# Patient Record
Sex: MALE | NOT HISPANIC OR LATINO | URBAN - METROPOLITAN AREA
[De-identification: names, ages, dates, MRNs, and addresses within clinical notes are randomized per-mention and may not be internally consistent; named-entity substitution may affect disease eponyms.]

---

## 2017-01-01 ENCOUNTER — OFFICE VISIT - HEALTHEAST (OUTPATIENT)
Dept: FAMILY MEDICINE | Facility: CLINIC | Age: 82
End: 2017-01-01

## 2017-01-01 ENCOUNTER — COMMUNICATION - HEALTHEAST (OUTPATIENT)
Dept: FAMILY MEDICINE | Facility: CLINIC | Age: 82
End: 2017-01-01

## 2017-01-01 ENCOUNTER — COMMUNICATION - HEALTHEAST (OUTPATIENT)
Dept: SCHEDULING | Facility: CLINIC | Age: 82
End: 2017-01-01

## 2017-01-01 ENCOUNTER — AMBULATORY - HEALTHEAST (OUTPATIENT)
Dept: PULMONOLOGY | Facility: OTHER | Age: 82
End: 2017-01-01

## 2017-01-01 ENCOUNTER — AMBULATORY - HEALTHEAST (OUTPATIENT)
Dept: FAMILY MEDICINE | Facility: CLINIC | Age: 82
End: 2017-01-01

## 2017-01-01 ENCOUNTER — COMMUNICATION - HEALTHEAST (OUTPATIENT)
Dept: PULMONOLOGY | Facility: OTHER | Age: 82
End: 2017-01-01

## 2017-01-01 ENCOUNTER — RECORDS - HEALTHEAST (OUTPATIENT)
Dept: ADMINISTRATIVE | Facility: OTHER | Age: 82
End: 2017-01-01

## 2017-01-01 ENCOUNTER — RECORDS - HEALTHEAST (OUTPATIENT)
Dept: LAB | Facility: CLINIC | Age: 82
End: 2017-01-01

## 2017-01-01 DIAGNOSIS — G89.29 CHRONIC PAIN: ICD-10-CM

## 2017-01-01 DIAGNOSIS — I48.91 ATRIAL FIBRILLATION WITH RVR (H): ICD-10-CM

## 2017-01-01 DIAGNOSIS — I63.9 CVA (CEREBRAL VASCULAR ACCIDENT) (H): ICD-10-CM

## 2017-01-01 DIAGNOSIS — I10 ESSENTIAL HYPERTENSION WITH GOAL BLOOD PRESSURE LESS THAN 140/90: ICD-10-CM

## 2017-01-01 DIAGNOSIS — J90 PLEURAL EFFUSION: ICD-10-CM

## 2017-01-01 DIAGNOSIS — J18.9 PNEUMONIA: ICD-10-CM

## 2017-01-01 DIAGNOSIS — I10 HYPERTENSION: ICD-10-CM

## 2017-01-01 DIAGNOSIS — R06.00 DYSPNEA: ICD-10-CM

## 2017-01-01 DIAGNOSIS — K59.00 CONSTIPATION: ICD-10-CM

## 2017-01-01 DIAGNOSIS — J44.9 COPD (CHRONIC OBSTRUCTIVE PULMONARY DISEASE) (H): ICD-10-CM

## 2017-01-01 DIAGNOSIS — J90 RECURRENT PLEURAL EFFUSION ON LEFT: ICD-10-CM

## 2017-01-01 DIAGNOSIS — R00.0 SINUS TACHYCARDIA: ICD-10-CM

## 2017-01-01 DIAGNOSIS — J90 LOCULATED PLEURAL EFFUSION: ICD-10-CM

## 2017-01-01 DIAGNOSIS — J20.9 ACUTE BRONCHITIS, UNSPECIFIED ORGANISM: ICD-10-CM

## 2017-01-01 DIAGNOSIS — K31.89 GASTRIC PERFORATION, ACUTE: ICD-10-CM

## 2017-01-01 DIAGNOSIS — I48.0 PAROXYSMAL ATRIAL FIBRILLATION (H): ICD-10-CM

## 2017-01-01 LAB
BASOPHILS # BLD AUTO: 0 THOU/UL (ref 0–0.2)
BASOPHILS NFR BLD AUTO: 0 % (ref 0–2)
EOSINOPHIL # BLD AUTO: 0.2 THOU/UL (ref 0–0.4)
EOSINOPHIL NFR BLD AUTO: 2 % (ref 0–6)
ERYTHROCYTE [DISTWIDTH] IN BLOOD BY AUTOMATED COUNT: 14.4 % (ref 11–14.5)
HCT VFR BLD AUTO: 36.6 % (ref 40–54)
HGB BLD-MCNC: 11 G/DL (ref 14–18)
LAB AP CHARGES (HE HISTORICAL CONVERSION): NORMAL
LYMPHOCYTES # BLD AUTO: 2.4 THOU/UL (ref 0.8–4.4)
LYMPHOCYTES NFR BLD AUTO: 24 % (ref 20–40)
MCH RBC QN AUTO: 28.1 PG (ref 27–34)
MCHC RBC AUTO-ENTMCNC: 30.1 G/DL (ref 32–36)
MCV RBC AUTO: 94 FL (ref 80–100)
MONOCYTES # BLD AUTO: 0.9 THOU/UL (ref 0–0.9)
MONOCYTES NFR BLD AUTO: 10 % (ref 2–10)
NEUTROPHILS # BLD AUTO: 6.1 THOU/UL (ref 2–7.7)
NEUTROPHILS NFR BLD AUTO: 64 % (ref 50–70)
PATH REPORT.COMMENTS IMP SPEC: NORMAL
PATH REPORT.COMMENTS IMP SPEC: NORMAL
PATH REPORT.FINAL DX SPEC: NORMAL
PATH REPORT.MICROSCOPIC SPEC OTHER STN: ABNORMAL
PATH REPORT.MICROSCOPIC SPEC OTHER STN: NORMAL
PATH REPORT.RELEVANT HX SPEC: NORMAL
PLATELET # BLD AUTO: 394 THOU/UL (ref 140–440)
PMV BLD AUTO: 10.4 FL (ref 8.5–12.5)
RBC # BLD AUTO: 3.91 MILL/UL (ref 4.4–6.2)
WBC: 9.7 THOU/UL (ref 4–11)

## 2017-01-01 RX ORDER — AMOXICILLIN 250 MG
2 CAPSULE ORAL DAILY PRN
Qty: 30 TABLET | Refills: 1 | Status: SHIPPED | OUTPATIENT
Start: 2017-01-01

## 2017-01-01 RX ORDER — ALBUTEROL SULFATE 90 UG/1
1-2 AEROSOL, METERED RESPIRATORY (INHALATION) EVERY 4 HOURS PRN
Qty: 8.5 G | Refills: 0 | Status: SHIPPED | OUTPATIENT
Start: 2017-01-01

## 2017-01-01 ASSESSMENT — MIFFLIN-ST. JEOR
SCORE: 1076.81
SCORE: 1092.7

## 2017-01-09 ENCOUNTER — COMMUNICATION - HEALTHEAST (OUTPATIENT)
Dept: FAMILY MEDICINE | Facility: CLINIC | Age: 82
End: 2017-01-09

## 2017-01-29 ENCOUNTER — COMMUNICATION - HEALTHEAST (OUTPATIENT)
Dept: FAMILY MEDICINE | Facility: CLINIC | Age: 82
End: 2017-01-29

## 2018-01-01 ENCOUNTER — COMMUNICATION - HEALTHEAST (OUTPATIENT)
Dept: FAMILY MEDICINE | Facility: CLINIC | Age: 83
End: 2018-01-01

## 2018-01-01 ENCOUNTER — HOME CARE/HOSPICE - HEALTHEAST (OUTPATIENT)
Dept: HOSPICE | Facility: HOSPICE | Age: 83
End: 2018-01-01

## 2018-01-01 DIAGNOSIS — I63.9 CVA (CEREBRAL VASCULAR ACCIDENT) (H): ICD-10-CM

## 2018-01-01 DIAGNOSIS — J44.1 COPD EXACERBATION (H): ICD-10-CM

## 2018-01-01 RX ORDER — LISINOPRIL 20 MG/1
10 TABLET ORAL DAILY
Qty: 90 TABLET | Refills: 1 | Status: SHIPPED | OUTPATIENT
Start: 2018-01-01

## 2021-05-30 VITALS — WEIGHT: 113 LBS | HEIGHT: 62 IN | BODY MASS INDEX: 20.8 KG/M2

## 2021-05-31 VITALS — HEIGHT: 61 IN | WEIGHT: 120 LBS | BODY MASS INDEX: 22.66 KG/M2

## 2021-05-31 VITALS — BODY MASS INDEX: 21.97 KG/M2 | WEIGHT: 120.1 LBS

## 2021-05-31 VITALS — WEIGHT: 123 LBS | BODY MASS INDEX: 22.5 KG/M2

## 2021-06-09 NOTE — PROGRESS NOTES
Assessment:     1. Pneumonia  traMADol (ULTRAM) 50 mg tablet    Comprehensive Metabolic Panel    HM1(CBC and Differential)    HM1 (CBC with Diff)   2. Hypertension     3. Atrial fibrillation with RVR         Plan:     1. Pneumonia  Patient is being seen after a four-day hospitalization for pneumonia.  Patient does have chronic lung disease with chronic recurrent left pleural effusion.  Patient was hospitalized with pneumonia and lactic acidosis was treated with intravenous antibiotics successfully treated rehydrated.  Patient presents today for a posthospital check is accompanied by his son.  The  failed to show up.  We communicated well  - traMADol (ULTRAM) 50 mg tablet; Take 1 tablet by mouth every 6 hours as needed for pain  Dispense: 30 tablet; Refill: 2  - Comprehensive Metabolic Panel  - HM1(CBC and Differential)  - HM1 (CBC with Diff)    2. Hypertension  Excellent numbers.  Continue lisinopril    3. Atrial fibrillation with RVR  Atrial fib at present.  Pros and cons of warfarin therapy.  Discussed we have decided on a baby aspirin with his past medical history of a GI perforation and GI bleed      Subjective:   Patient is being seen for follow-up after a four-day hospitalization for pneumonia.  Patient has chronic left sided pleural effusion complex past medical history recent history.  Gastrointestinal bleed secondary to gastrointestinal perforation.  Hypertension.  During hospitalization, well controlled with lisinopril.  Patient was in lactic acidosis due to pneumonia.  IV hydration helped the patient's recovery.  He was discharged and told to follow-up here in the clinic.  I am doing a medical reconciliation today.  We have reviewed all his medications.  We have decided to stay the course with his lisinopril.  His hypertensive control preventative a basal metabolic profile today.  He is to continue with his hydrochlorothiazide.  The question came up during his hospitalization whether patient  "needed warfarin with his history of GI bleed.  I think this is not the way to goal.  We should keep him on a baby aspirin.  He was not in atrial fibrillation today.  He feels well, eating well, accompanied by his son  failed to show up.  We are communicating.  He did quite well.  QUESTIONS THAT WERE ASKED WERE ANSWERED I PERSONALLY REVIEWED, FAMILY, SOCIAL HISTORY, SURGERIES, ALLERGIES, PROBLEM LIST AND SPENT time with coordination of care and discussion with the son.  We have asked him to return in 1 month's time.  I'll repeat his chest x-ray and go over his clinical findings.  Again, in addition to reviewing his medicines that he takes at home.  He's been getting excellent home care;30 minutes , 50% of the time was spent coordinating care and counseling the son    Review of Systems: A complete 14 point review of systems was obtained and is negative or as stated in the history of present illness.    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      Severe malnutrition      Stroke      No family history on file.  Past Surgical History:   Procedure Laterality Date     EXPLORATORY LAPAROTOMY N/A 9/27/2016    Procedure: LAPAROTOMY, WITH KEYA PATCH FOR PERFORATED ULCER;  Surgeon: Demian Lopez MD;  Location: [facility];  Service:      Social History   Substance Use Topics     Smoking status: Former Smoker     Quit date: 7/1/2012     Smokeless tobacco: Never Used     Alcohol use No         Objective:     Visit Vitals     BP 98/58     Pulse 80     Temp 97  F (36.1  C)     Ht 5' 2\" (1.575 m)     Wt 113 lb (51.3 kg)     BMI 20.67 kg/m2       General Appearance:  Alert, cooperative, no distress  Head:  " Normocephalic, no obvious abnormality  Ears: TM anatomy normal  Eyes:  PERRL, EOM's intact, conjunctiva and corneas clear  Nose:  Nares symmetrical, septum midline, mucosa pink, no sinus tenderness  Throat:  Lips, tongue, and mucosa are moist, pink, and intact  Neck:  Supple, symmetrical, trachea midline, no adenopathy; thyroid: no enlargement, symmetric,no tenderness/mass/nodules; no carotid bruit, no JVD  Back:  Symmetrical, no curvature, ROM normal, no CVA tenderness  Chest/Breast:  No mass or tenderness  Lungs:  Chest sounds are generally absent on the left side.  The right sided nodularity, rales or rhonchi are absent  Heart:  Normal PMI, regular rate & rhythm, S1 and S2 normal, no murmurs, rubs, or gallops  Abdomen:  Soft, non-tender, bowel sounds active all four quadrants, no mass, or organomegaly  Musculoskeletal:  Tone and strength strong and symmetrical, all extremities  Lymphatic:  No adenopathy  Skin/Hair/Nails:  Skin warm, dry, and intact, no rashes  Neurologic:  Alert and oriented x3, no cranial nerve deficits, normal strength and tone, gait steady  Extremities:  No edema.  Isaac's sign negative.  No signs.  DVT    Genitourinary: deferred  Pulses:  Equal bilaterally     I have had an Advance Directives discussion with the patient.      This note has been dictated using voice recognition software. Any grammatical or context distortions are unintentional and inherent to the the software.

## 2021-06-10 NOTE — PROGRESS NOTES
Assessment/Plan:     1.  Chronic pain  Works well for patient provided refill.  Will also continue Tylenol as needed.  Did not find gabapentin to be helpful and does not want to continue the medication.  - traMADol (ULTRAM) 50 mg tablet; Take 1 tablet by mouth every 6 hours as needed for pain  Dispense: 60 tablet; Refill: 1    2. CVA (cerebral vascular accident)  Will restart medication.  Also plan to continue 325 aspirin as was recommended in hospital.  Discussed possible PT OT but they declined.  - atorvastatin (LIPITOR) 40 MG tablet; Take 1 tablet (40 mg total) by mouth daily.  Dispense: 30 tablet; Refill: 3    3. Essential hypertension with goal blood pressure less than 140/90  We will restart previous lisinopril as blood pressure has been elevated.  Plan to continue to monitor.  Will call if out of range and will plan to follow-up with Dr. Cho in a month or so for recheck and possible labs  - lisinopril (PRINIVIL,ZESTRIL) 20 MG tablet; Take 1 tablet (20 mg total) by mouth daily.  Dispense: 30 tablet; Refill: 3    4. Gastric perforation, acute  Currently stable medication refilled  - omeprazole (PRILOSEC) 20 MG capsule; Take 1 capsule (20 mg total) by mouth 2 (two) times a day.  Dispense: 60 capsule; Refill: 3      Total time spent was 45 minutes, >50% spent discussing treatment options noted above, counseling and coordination of care.     Subjective:      Pradip Amaro is a 81 y.o. male comes in today for hospital in transitional care unit follow-up.  He is here with son and .  In the hospital March 24-25 and then was discharged from transitional care unit on April 13.  He had a stroke with some left-sided weakness.  He states overall he is feeling well.  Reviewed the discharge summaries from hospital and transitional care unit.  Performed medication reconciliation.  Reviewed the labs imaging and medical test that was done.  He had normal echo and bubble study.  He did have MRI showing stroke.   His cholesterol was mildly elevated and so atorvastatin was started.  He was recommended by specialty team that he increase aspirin to 325.  He continues to take omeprazole due to history of gastric bleed.  He states no new GI symptoms.  It does appear as though gabapentin was started perhaps the transitional care unit for pain but they have not found it helpful and do not want to continue it.  He takes Tylenol and tramadol usually around 1 or 2 per day for chronic pain.  They have noticed the blood pressure has been elevated.  He is not currently taking blood pressure medications but it does look like he was on lisinopril in the past remembers tolerating it well.  They have been checking blood pressure by a nurse at his adult  program and it is typically between 180-200.  It is elevated today at 162/86.  They would be willing to restart the medication.  He does endorse some continued left-sided weakness.  He does exercises at the day program is not interested in any further physical therapy at this time.  No other new concerns today.    Current Outpatient Prescriptions   Medication Sig Dispense Refill     acetaminophen (TYLENOL) 325 MG tablet Take 2 tablets (650 mg total) by mouth every 6 (six) hours as needed for pain or fever.       aspirin 81 mg chewable tablet Chew 4 tablets (325 mg total) daily.  0     atorvastatin (LIPITOR) 40 MG tablet Take 1 tablet (40 mg total) by mouth daily. 30 tablet 3     omeprazole (PRILOSEC) 20 MG capsule Take 1 capsule (20 mg total) by mouth 2 (two) times a day. 60 capsule 3     traMADol (ULTRAM) 50 mg tablet Take 1 tablet by mouth every 6 hours as needed for pain 60 tablet 1     lisinopril (PRINIVIL,ZESTRIL) 20 MG tablet Take 1 tablet (20 mg total) by mouth daily. 30 tablet 3     No current facility-administered medications for this visit.        Past Medical History, Family History, and Social History reviewed.  Past Medical History:   Diagnosis Date     Acute respiratory  failure with hypoxia      Atrial fibrillation      Atrial fibrillation with RVR      COPD exacerbation      Gastric perforation 9/26/2016     Leukocytosis 7/1/2016     Lung mass 7/1/2016     Pleural effusion      Pulmonary edema 2003    Pt gets thorcentesis done q 6 months since 2003.      Severe malnutrition      Stroke      Past Surgical History:   Procedure Laterality Date     EXPLORATORY LAPAROTOMY N/A 9/27/2016    Procedure: LAPAROTOMY, WITH KEYA PATCH FOR PERFORATED ULCER;  Surgeon: Demian Lopez MD;  Location: [facility] Main OR;  Service:      Review of patient's allergies indicates no known allergies.  No family history on file.  Social History     Social History     Marital status:      Spouse name: N/A     Number of children: N/A     Years of education: N/A     Occupational History     Not on file.     Social History Main Topics     Smoking status: Former Smoker     Quit date: 7/1/2012     Smokeless tobacco: Never Used     Alcohol use No     Drug use: No     Sexual activity: Not on file     Other Topics Concern     Not on file     Social History Narrative         Review of systems is as stated in HPI, and the remainder of the 10 system review is otherwise negative.    Objective:     Vitals:    05/11/17 0706 05/11/17 0711   BP: 164/86 162/86   Pulse: 96    SpO2: 96%    Weight: 120 lb 1.6 oz (54.5 kg)     Body mass index is 21.97 kg/(m^2).    General Appearance:    Alert, cooperative, no distress, appears stated age   Head:    Normocephalic, without obvious abnormality, atraumatic   Eyes:    PERRL, EOM's intact   Ears:    Normal TM's and external ear canals   Nose:   Mucosa normal, no drainage     or sinus tenderness   Throat:   Oropharynx is clear   Neck:   Supple, symmetrical, no adenopathy, no thyromegally       Lungs:     Clear to auscultation bilaterally, respirations unlabored   Chest Wall:    No tenderness or deformity    Heart:    Regular rate and rhythm, S1 and S2 normal, no murmur, rub     or gallop       Abdomen:     Soft, non-tender, bowel sounds active all four quadrants,     no masses, no organomegaly           Extremities:  uses cane, some left-sided weakness compared to right otherwise extremities normal, atraumatic, no cyanosis or edema   Pulses:   2+ and symmetric all extremities   Skin:   No rashes or lesions         This note has been dictated using voice recognition software. Any grammatical or context distortions are unintentional and inherent to the the software.

## 2021-06-12 NOTE — PROGRESS NOTES
Assessment:     1. Acute bronchitis, unspecified organism  HM1(CBC and Differential)    Comprehensive Metabolic Panel    HM1 (CBC with Diff)   2. Recurrent pleural effusion on left  HM1(CBC and Differential)    Comprehensive Metabolic Panel    HM1 (CBC with Diff)   3. Paroxysmal atrial fibrillation         Plan:     1. Acute bronchitis, unspecified organism  We will check the following parameters to make sure his white count has returned to normal and that his anemia is stable  - HM1(CBC and Differential)  - Comprehensive Metabolic Panel  - HM1 (CBC with Diff)    2. Recurrent pleural effusion on left  Workup to include the following  - HM1(CBC and Differential)  - Comprehensive Metabolic Panel  - HM1 (CBC with Diff)    3. Paroxysmal atrial fibrillation  Stable; will follow with cardiology      Subjective:   This is an 81-year-old male who I am seeing for a post hospital check patient was hospitalized complaining of shortness of breath and a cough on August 28, 2017 presented to [facility] seen and evaluated in the emergency room he was noted to have some old changes due to chronic left-sided pleural effusion due to wartime injury during the [place] Era; subsequent pleurodesis and multiple chest tubes in the past.  Patient was found to have a large loculated left pleural effusion which was stable over the past year but did have some increased bronchial wall thickening which was concerning for a bronchitis.  The patient was admitted he was nebulized placed on antibiotics discharged on Levaquin.  He was evaluated by pulmonary.  There was a decision made not to intervene with regards to his pleural effusion which coincides with her opinions in the past.  Patient ultimately stabilized improved clinically was discharged with normal O2 sats on room air he did not need oxygen to go home he was discharged on albuterol Levaquin and tramadol for pain.  The patient already is being treated for hyperlipidemia and  benign essential hypertension.  He presents today with an  along with his son.  The patient looks quite robust.  His appetite is good he did have a GI bleed a year ago is recovered from that he did have gastrointestinal surgery a year ago due to the GI bleed he is recovered from that he eats 6 times a day small portions he did tip over in his chair 1 week ago and hurt her shoulder but that has since quieted down and he has no pain whatsoever he did not lose consciousness he would just was an unstable chair.  Patient feels well no cough no shortness of breath.  Patient is seen today is in chronic atrial fibrillation he denies any shortness of breath or dyspnea systems review 14 point essentially negative.  I did a medication management we will check his metabolic profile in his hemogram I will see him for follow-up 3 months.  All medications were reviewed all questions that were asked were answered through the  from the father to the son through the  to me.  40 minute visit.    Review of Systems: A complete 14 point review of systems was obtained and is negative or as stated in the history of present illness.    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      Severe malnutrition      Stroke      No family history on file.  Past Surgical History:   Procedure Laterality Date     EXPLORATORY LAPAROTOMY N/A 9/27/2016    Procedure: LAPAROTOMY, WITH KEYA PATCH FOR PERFORATED ULCER;  Surgeon: Demian Lopez MD;  Location: [facility];  Service:      Social History   Substance Use Topics     Smoking status: Former Smoker     Quit date: 7/1/2012     Smokeless tobacco: Never Used      "Alcohol use No         Objective:   /78  Pulse 76  Temp 96.6  F (35.9  C)  Ht 5' 1\" (1.549 m)  Wt 120 lb (54.4 kg)  BMI 22.67 kg/m2    General Appearance:  Alert, cooperative, no distress  Head:  Normocephalic, no obvious abnormality  Ears: TM anatomy normal  Eyes:  PERRL, EOM's intact, conjunctiva and corneas clear  Nose:  Nares symmetrical, septum midline, mucosa pink, no sinus tenderness  Throat:  Lips, tongue, and mucosa are moist, pink, and intact  Neck:  Supple, symmetrical, trachea midline, no adenopathy; thyroid: no enlargement, symmetric,no tenderness/mass/nodules; no carotid bruit, no JVD  Back:  Symmetrical, no curvature, ROM normal, no CVA tenderness  Chest/Breast:  No mass or tenderness  Lungs:  Clear to auscultation bilaterally, respirations unlabored   Heart:  Normal PMI, regular rate & rhythm, S1 and S2 normal, no murmurs, rubs, or gallops  Abdomen:  Soft, non-tender, bowel sounds active all four quadrants, no mass, or organomegaly  Musculoskeletal:  Tone and strength strong and symmetrical, all extremities  Lymphatic:  No adenopathy  Skin/Hair/Nails:  Skin warm, dry, and intact, no rashes  Neurologic:  Alert and oriented x3, no cranial nerve deficits, normal strength and tone, gait steady  Extremities:  No edema.  Isaac's sign negative.    Genitourinary: deferred  Pulses:  Equal bilaterally     I have had an Advance Directives discussion with the patient.      This note has been dictated using voice recognition software. Any grammatical or context distortions are unintentional and inherent to the the software.   "

## 2021-06-12 NOTE — PROGRESS NOTES
Subjective:      Patient ID: Pradip Amaro is a 81 y.o. male.    Chief Complaint:    HPI  Patient is brought in for evaluation by his son.  The first issue was refill of tramadol for chronic abdominal pain.  They had requested a refill 2 days ago, but his primary care doctor has not responded to the refill request yet.    Also the patient has been having increasing shortness of breath for the last 1 week.  Furthermore he has had swollen ankles for the past week as well.    Son also relates that the patient has been having some vision changes over the last couple weeks.    Past medical history is significant for COPD, CVA, lung mass and previous recurrent pulmonary effusions that needed drainage every couple months for many years.     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      Severe malnutrition      Stroke        Past Surgical History:   Procedure Laterality Date     EXPLORATORY LAPAROTOMY N/A 9/27/2016    Procedure: LAPAROTOMY, WITH KEYA PATCH FOR PERFORATED ULCER;  Surgeon: Demian Lopez MD;  Location: [facility];  Service:        No family history on file.    Social History   Substance Use Topics     Smoking status: Former Smoker     Quit date: 7/1/2012     Smokeless tobacco: Never Used     Alcohol use No       Review of Systems    Objective:     BP (!) 138/98  Pulse 100  Temp 97.8  F (36.6  C) (Oral)   Resp (!) 32  Wt 123 lb (55.8 kg)  SpO2 93%  BMI 22.5 kg/m2    Physical Exam   Constitutional:   He is sitting and appears to be SOB with rapid breathing.    Cardiovascular: Regular rhythm.    Tachy at 100.    Pulmonary/Chest:   Frequent respirations between 30 and 32/min.  He is taking short  breaths.  Inspiratory crackles appreciated along the right posterior lung base as well as a few in the anterior lung fields.   Musculoskeletal:   2+ pitting edema involving the medial ankles.   Neurological: He is alert.   Skin: Skin is warm and dry.   Slightly pale       Assessment:     Procedures    The encounter diagnosis was Dyspnea. With the ankle swelling and rales, this may be due to CHF. I anticipate he will need admission.    Plan:     Recommended pt be evaluated in the ED

## 2021-06-14 NOTE — PROGRESS NOTES
Assessment:     1. Constipation  senna-docusate (PERICOLACE) 8.6-50 mg tablet   2. Atrial fibrillation with RVR     3. Sinus tachycardia     4. Loculated pleural effusion         Plan:     1. Constipation  Patient will take a combination of Senna and Colace twice daily; MiraLAX 1 tablespoon daily; increase hydration patient was placed on  - senna-docusate (PERICOLACE) 8.6-50 mg tablet; Take 2 tablets by mouth daily as needed for constipation.  Dispense: 30 tablet; Refill: 1    2. Atrial fibrillation with RVR  Patient was placed on beta-blockade with metoprolol 25 mg pulse has decreased to 90 patient is markedly less short of breath    3. Sinus tachycardia  Good response to beta-blockade so far main complaint related to gastrointestinal complaints secondary to constipation    4. Loculated pleural effusion  Chronic pleural effusion status quo based on chest x-ray and imaging obtained while hospitalized      Subjective:   This patient is being seen for follow-up after hospitalization last week at [facility] for shortness of breath patient was found to be in tachycardia the patient does have a recurrent chronic pleural effusion on the left side of his chest.  He was seen and evaluated placed on oxygen placed on beta-blockade pulse came down shortness of breath improved patient was discharged to home only medication added to his regime which includes lisinopril atorvastatin omeprazole and tramadol for pain was metoprolol 25 mg once daily.  Patient is being seen today in the presence of his daughter and an  main complaint is abdominal pain patient is passing gas but has not had a bowel movement in the last 3 days and before that was passing palpable like stool.  Exam today shows a somewhat distended abdomen the patient guards a little bit in his left upper quadrant however I do hear bowel sounds throughout which are a little bit hyperactive consistent with the clinical examination of constipation.  I  plan on trying to stimulate his bowel in addition adding some fiber and to soften whatever stool is there hence we are going to use combination of senna and Colace on a daily basis; he had MiraLAX 1 tablespoon daily increase his hydration.  Continue his other medications.  The patient is alert cooperative is not complaining of shortness of breath he denies any headaches he is able to tolerate foods he does not like cold liquids he tolerates full liquids and is able to chew his food he denies any hemoptysis chest pain.  Family have no other complaints or plan here is to do slow steady bowel cleansing as we can the family will alert us if he develops recurrent shortness of breath chest pain or fails to respond to the toilet that we have plan.  All medical questions that were asked were answered I personally reviewed his hospitalization discharge summary medical plan we will see him for follow-up 1 month sooner if any problems.  Family are well versed in communicating with nurse triage or emergency services if the patient goes downhill.  Complicated past medical history.  Medical management also done today.    Review of Systems: A complete 14 point review of systems was obtained and is negative or as stated in the history of present illness.    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      Severe malnutrition      Stroke      No family history on file.  Past Surgical History:   Procedure Laterality Date     EXPLORATORY LAPAROTOMY N/A 9/27/2016    Procedure: LAPAROTOMY, WITH KEYA PATCH FOR PERFORATED ULCER;  Surgeon: Demian Lopez MD;  Location: [facility];  Service:      Social History   Substance Use Topics      Smoking status: Former Smoker     Quit date: 7/1/2012     Smokeless tobacco: Never Used     Alcohol use No         Objective:   /62 (Patient Site: Right Arm, Patient Position: Sitting, Cuff Size: Adult Regular)  Pulse 70  SpO2 90%    General Appearance:  Alert, cooperative, no distress  Head:  Normocephalic, no obvious abnormality  Ears: TM anatomy normal  Eyes:  PERRL, EOM's intact, conjunctiva and corneas clear  Nose:  Nares symmetrical, septum midline, mucosa pink, no sinus tenderness  Throat:  Lips, tongue, and mucosa are moist, pink, and intact  Neck:  Supple, symmetrical, trachea midline, no adenopathy; thyroid: no enlargement, symmetric,no tenderness/mass/nodules; no carotid bruit, no JVD  Back:  Symmetrical, no curvature, ROM normal, no CVA tenderness  Chest/Breast:  No mass or tenderness  Lungs: Respirations unlabored.  Breath sounds decreased left side due to chronic pleural effusion; no rales or rhonchi heard in rest of lung fields  Heart:  , S1 and S2 normal, no murmurs, rubs, or gallops pulse at 90  Abdomen:  Soft, non-tender, bowel sounds active all four quadrants, no mass, or organomegaly  Musculoskeletal:  Tone and strength strong and symmetrical, all extremities  Lymphatic:  No adenopathy  Skin/Hair/Nails:  Skin warm, dry, and intact, no rashes  Neurologic:  Alert and oriented x3, patient is in a wheelchair and is communicative through an   Extremities:  No edema.  Isaac's sign negative.    Genitourinary: deferred  Pulses:  Equal bilaterally     I have had an Advance Directives discussion with the patient.      This note has been dictated using voice recognition software. Any grammatical or context distortions are unintentional and inherent to the the software.

## 2021-06-15 PROBLEM — I63.9 CVA (CEREBRAL VASCULAR ACCIDENT) (H): Status: ACTIVE | Noted: 2017-01-01

## 2021-06-15 PROBLEM — J18.9 CAP (COMMUNITY ACQUIRED PNEUMONIA): Status: ACTIVE | Noted: 2017-01-01

## 2021-06-16 PROBLEM — A41.9 SEVERE SEPSIS (H): Status: ACTIVE | Noted: 2018-01-01

## 2021-06-16 PROBLEM — E43 PROTEIN-CALORIE MALNUTRITION, SEVERE (H): Status: ACTIVE | Noted: 2018-01-01

## 2021-06-16 PROBLEM — Z51.5 PALLIATIVE CARE ENCOUNTER: Status: ACTIVE | Noted: 2018-01-01

## 2021-06-16 PROBLEM — R06.02 SHORTNESS OF BREATH: Status: ACTIVE | Noted: 2018-01-01

## 2021-06-16 PROBLEM — J20.9 ACUTE BRONCHITIS: Status: ACTIVE | Noted: 2017-01-01

## 2021-06-16 PROBLEM — R19.7 DIARRHEA: Status: ACTIVE | Noted: 2018-01-01

## 2021-06-16 PROBLEM — Z86.73 HISTORY OF STROKE: Status: ACTIVE | Noted: 2018-01-01

## 2021-06-16 PROBLEM — R91.8 MASS OF LEFT LUNG: Status: ACTIVE | Noted: 2018-01-01

## 2021-06-16 PROBLEM — F17.201 TOBACCO DEPENDENCE IN REMISSION: Status: ACTIVE | Noted: 2017-01-01

## 2021-06-16 PROBLEM — R53.1 GENERALIZED WEAKNESS: Status: ACTIVE | Noted: 2018-01-01

## 2021-06-16 PROBLEM — J90 LOCULATED PLEURAL EFFUSION: Status: ACTIVE | Noted: 2017-01-01

## 2021-06-16 PROBLEM — R65.20 SEVERE SEPSIS (H): Status: ACTIVE | Noted: 2018-01-01

## 2021-06-16 PROBLEM — K85.90 PANCREATITIS, ACUTE: Status: ACTIVE | Noted: 2017-01-01

## 2021-06-16 PROBLEM — I16.0 HYPERTENSIVE URGENCY: Status: ACTIVE | Noted: 2017-01-01
